# Patient Record
Sex: FEMALE | Employment: UNEMPLOYED | URBAN - METROPOLITAN AREA
[De-identification: names, ages, dates, MRNs, and addresses within clinical notes are randomized per-mention and may not be internally consistent; named-entity substitution may affect disease eponyms.]

---

## 2021-06-06 ENCOUNTER — HOSPITAL ENCOUNTER (EMERGENCY)
Facility: CLINIC | Age: 36
Discharge: HOME | End: 2021-06-06
Attending: EMERGENCY MEDICINE | Admitting: EMERGENCY MEDICINE
Payer: COMMERCIAL

## 2021-06-06 VITALS
RESPIRATION RATE: 16 BRPM | WEIGHT: 135 LBS | DIASTOLIC BLOOD PRESSURE: 104 MMHG | SYSTOLIC BLOOD PRESSURE: 141 MMHG | TEMPERATURE: 97.9 F | HEART RATE: 100 BPM | HEIGHT: 63 IN | BODY MASS INDEX: 23.92 KG/M2 | OXYGEN SATURATION: 100 %

## 2021-06-06 DIAGNOSIS — T43.221A: ICD-10-CM

## 2021-06-06 LAB
ANION GAP SERPL CALCULATED.3IONS-SCNC: 5 MMOL/L (ref 3–14)
APAP SERPL-MCNC: <2 MG/L (ref 10–20)
BASOPHILS # BLD AUTO: 0 10E9/L (ref 0–0.2)
BASOPHILS NFR BLD AUTO: 0.3 %
BUN SERPL-MCNC: 11 MG/DL (ref 7–30)
CALCIUM SERPL-MCNC: 8.7 MG/DL (ref 8.5–10.1)
CHLORIDE SERPL-SCNC: 108 MMOL/L (ref 94–109)
CO2 SERPL-SCNC: 27 MMOL/L (ref 20–32)
CREAT SERPL-MCNC: 0.67 MG/DL (ref 0.52–1.04)
DIFFERENTIAL METHOD BLD: ABNORMAL
EOSINOPHIL # BLD AUTO: 0 10E9/L (ref 0–0.7)
EOSINOPHIL NFR BLD AUTO: 0.2 %
ERYTHROCYTE [DISTWIDTH] IN BLOOD BY AUTOMATED COUNT: 13.3 % (ref 10–15)
GFR SERPL CREATININE-BSD FRML MDRD: >90 ML/MIN/{1.73_M2}
GLUCOSE SERPL-MCNC: 103 MG/DL (ref 70–99)
HCT VFR BLD AUTO: 41.2 % (ref 35–47)
HGB BLD-MCNC: 13.3 G/DL (ref 11.7–15.7)
IMM GRANULOCYTES # BLD: 0 10E9/L (ref 0–0.4)
IMM GRANULOCYTES NFR BLD: 0.4 %
INTERPRETATION ECG - MUSE: NORMAL
LYMPHOCYTES # BLD AUTO: 0.6 10E9/L (ref 0.8–5.3)
LYMPHOCYTES NFR BLD AUTO: 6.4 %
MAGNESIUM SERPL-MCNC: 2.5 MG/DL (ref 1.6–2.3)
MCH RBC QN AUTO: 28.3 PG (ref 26.5–33)
MCHC RBC AUTO-ENTMCNC: 32.3 G/DL (ref 31.5–36.5)
MCV RBC AUTO: 88 FL (ref 78–100)
MONOCYTES # BLD AUTO: 0.7 10E9/L (ref 0–1.3)
MONOCYTES NFR BLD AUTO: 7.1 %
NEUTROPHILS # BLD AUTO: 7.8 10E9/L (ref 1.6–8.3)
NEUTROPHILS NFR BLD AUTO: 85.6 %
NRBC # BLD AUTO: 0 10*3/UL
NRBC BLD AUTO-RTO: 0 /100
PLATELET # BLD AUTO: 282 10E9/L (ref 150–450)
POTASSIUM SERPL-SCNC: 3.1 MMOL/L (ref 3.4–5.3)
RBC # BLD AUTO: 4.7 10E12/L (ref 3.8–5.2)
SALICYLATES SERPL-MCNC: <2 MG/DL
SODIUM SERPL-SCNC: 140 MMOL/L (ref 133–144)
TROPONIN I SERPL-MCNC: <0.015 UG/L (ref 0–0.04)
WBC # BLD AUTO: 9.2 10E9/L (ref 4–11)

## 2021-06-06 PROCEDURE — 99284 EMERGENCY DEPT VISIT MOD MDM: CPT | Mod: 25

## 2021-06-06 PROCEDURE — 83735 ASSAY OF MAGNESIUM: CPT | Performed by: EMERGENCY MEDICINE

## 2021-06-06 PROCEDURE — 80048 BASIC METABOLIC PNL TOTAL CA: CPT | Performed by: EMERGENCY MEDICINE

## 2021-06-06 PROCEDURE — 80143 DRUG ASSAY ACETAMINOPHEN: CPT | Performed by: EMERGENCY MEDICINE

## 2021-06-06 PROCEDURE — 258N000003 HC RX IP 258 OP 636: Performed by: EMERGENCY MEDICINE

## 2021-06-06 PROCEDURE — 96360 HYDRATION IV INFUSION INIT: CPT

## 2021-06-06 PROCEDURE — 93005 ELECTROCARDIOGRAM TRACING: CPT

## 2021-06-06 PROCEDURE — 85025 COMPLETE CBC W/AUTO DIFF WBC: CPT | Performed by: EMERGENCY MEDICINE

## 2021-06-06 PROCEDURE — 250N000013 HC RX MED GY IP 250 OP 250 PS 637: Performed by: EMERGENCY MEDICINE

## 2021-06-06 PROCEDURE — 80179 DRUG ASSAY SALICYLATE: CPT | Performed by: EMERGENCY MEDICINE

## 2021-06-06 PROCEDURE — 84484 ASSAY OF TROPONIN QUANT: CPT | Performed by: EMERGENCY MEDICINE

## 2021-06-06 RX ORDER — POTASSIUM CHLORIDE 1500 MG/1
40 TABLET, EXTENDED RELEASE ORAL ONCE
Status: COMPLETED | OUTPATIENT
Start: 2021-06-06 | End: 2021-06-06

## 2021-06-06 RX ADMIN — POTASSIUM CHLORIDE 40 MEQ: 1500 TABLET, EXTENDED RELEASE ORAL at 09:09

## 2021-06-06 RX ADMIN — SODIUM CHLORIDE 1000 ML: 9 INJECTION, SOLUTION INTRAVENOUS at 07:44

## 2021-06-06 ASSESSMENT — ENCOUNTER SYMPTOMS
LIGHT-HEADEDNESS: 0
NAUSEA: 0
ABDOMINAL PAIN: 0
PALPITATIONS: 0
COUGH: 0
DIZZINESS: 1
CHILLS: 0
CHEST TIGHTNESS: 0
VOMITING: 0
FEVER: 0

## 2021-06-06 ASSESSMENT — MIFFLIN-ST. JEOR: SCORE: 1286.49

## 2021-06-06 NOTE — ED TRIAGE NOTES
Pt says she unintentionally took triple dose of her Sertraline dose. She believes she took a total of 450mg. She is c/o feeling very tired.

## 2021-06-06 NOTE — ED PROVIDER NOTES
"  History   Chief Complaint:  Drug Overdose     HPI   Emerita Lemon is a 35 year old female with history of postpartum depression who presents after a drug overdose. An hour ago, the patient was distracted and unintentionally took triple her Sertraline dose which she has been taking for four years. She believes it was a total of 450mg. Once she realized this, she tried to force herself to throw up, but this was unsuccessful. She notes dizziness and an \"odd feeling\" in her chest but denies any chest pain or chest tightness. Additionally, she denies any suicidal ideation, homicidal ideation, light-headedness, palpitations, fever, chills, cough, chest pain, abdominal pain, nausea or vomiting. Of note, she was in assisted for the past two days and hasn't been drinking as much water as normal. She smokes about 1/2 a pack of cigarettes a day.    Review of Systems   Constitutional: Negative for chills and fever.        Tired   Respiratory: Negative for cough and chest tightness.    Cardiovascular: Negative for chest pain and palpitations.        \"Odd feeling\" in chest   Gastrointestinal: Negative for abdominal pain, nausea and vomiting.   Neurological: Positive for dizziness. Negative for light-headedness.   Psychiatric/Behavioral: Negative for suicidal ideas.   All other systems reviewed and are negative.    Allergies:  The patient has no known allergies.     Medications  Calcium carbonate  Ferrous sulfate    Past Medical History:    Anemia    Past Surgical History:    LEEP TX, Cervical  [place] teeth  Postpartum depression  Tobacco abuse  Group B Streptococcus carrier  Varicella  STD  Migraine  Anxiety    Family History:    Father: Depression  Mother: Depression  Sister: Depression    Social History:  The patient was unaccompanied to the ED.    Physical Exam     Patient Vitals for the past 24 hrs:   BP Temp Temp src Pulse Resp SpO2 Height Weight   06/06/21 1100 (!) 141/104 -- -- 100 16 100 % -- --   06/06/21 1055 -- -- -- " "-- -- 100 % -- --   06/06/21 1035 -- -- -- -- -- 100 % -- --   06/06/21 1000 (!) 156/99 -- -- 102 -- 100 % -- --   06/06/21 0940 (!) 142/100 -- -- 102 -- 100 % -- --   06/06/21 0930 -- -- -- -- -- 100 % -- --   06/06/21 0910 -- -- -- -- -- 100 % -- --   06/06/21 0850 -- -- -- -- -- 100 % -- --   06/06/21 0830 (!) 138/90 -- -- 107 -- 100 % -- --   06/06/21 0825 (!) 150/112 -- -- 100 -- 100 % -- --   06/06/21 0800 (!) 140/102 -- -- 106 -- 100 % -- --   06/06/21 0750 (!) 143/103 -- -- 103 -- 99 % -- --   06/06/21 0655 (!) 140/110 97.9  F (36.6  C) Oral 113 16 98 % 1.6 m (5' 3\") 61.2 kg (135 lb)       Physical Exam  Constitutional: Well developed, nontox appearance  Head: Atraumatic.   Mouth/Throat: Oropharynx is clear and moist.   Neck:  no stridor  Eyes: no scleral icterus  Cardiovascular: Borderline regular tachycardia, 2+ bilat radial pulses  Pulmonary/Chest: nml resp effort, Clear BS bilat  Abdominal: ND, soft, NT, no rebound or guarding   Ext: Warm, well perfused, no edema  Neurological: A&O, symmetric facies, moves ext x4  Skin: Skin is warm and dry.   Psychiatric: Behavior is normal. Thought content normal.   Nursing note and vitals reviewed.    Emergency Department Course   ECG:  ECG taken at 0732  Normal sinus rhythm  Normal ECG  No significant change compared to EKG dated 12/22/2002  Rate 100 bpm. WY interval 124 ms. QRS duration 80 ms. QT/QTc 352/454 ms. P-R-T axes 67 30 48.    Laboratory:  CBC: WBC 9.2, HGB 13.3,   BMP: Potassium 3.1 (L), Glucose 103 (H) o/w WNL (Creatinine 0.67)    Troponin (Collected 0742): <0.015    Magnesium: 2.5 (H)    Acetaminophen Level: <2    Salicylate level: <2    Emergency Department Course:  Reviewed:  I reviewed nursing notes, vitals, past medical history and care everywhere    Assessments:  0717 I obtained history and examined the patient as noted above.     Consults:   2306 I spoke with poison control regarding the patient.    1003 I spoke with poison control regarding " the patient.    Interventions:  0744 NS 1L IV Bolus  0909 Potassium chloride 40mEq PO    Disposition:  The patient eloped from the ED prior to discharge.     Impression & Plan   Medical Decision Makin year old female presenting w/ concern for sertraline overdose     DDx includes sertraline overdose, electrolyte abnormality, dehydration, EKG abnormality, unintentional overdose, serotonin syndrome although inconsistent with clinical presentation at this time.  Patient seems reliable on initial interview; doubt intentional overdose or self-harm/suicide attempt.  EKG interpretation as noted above with normal intervals, no evidence of dysrhythmia.  Discussed patient with poison control who recommended monitoring the patient for approximately 6 hours given the patient is unsure of how much sertraline she actually took. Labs significant for mild hypokalemia otherwise unremarkable.  Potassium repleted in the emergency department.  Pt remained stable in ED.  She eloped 5 hours after her ingestion.    Diagnosis:    ICD-10-CM    1. Selective serotonin re-uptake inhibitor overdose, accidental or unintentional, initial encounter  T43.221A        Scribe Disclosure:  KRISTIAN, Gabriel Mcintyre, am serving as a scribe at 6:59 AM on 2021 to document services personally performed by Eldon Andrews MD based on my observations and the provider's statements to me.      Eldon Andrews MD  21 [id number]

## 2021-06-06 NOTE — ED NOTES
Pt put call light on stating her ride is coming, she wants to leave and she wants her IV out. Informed pt MD wanted her to be observed until noon. Pt states she still wants to leave. MD informed.

## 2021-06-06 NOTE — DISCHARGE INSTRUCTIONS
Please return to the emergency department as needed for new or worsening symptoms including vomiting and unable to keep anything down, severe confusion, fainting

## 2021-06-06 NOTE — ED NOTES
Updated pt with plan of care of monitoring her for 6 hours post ingestion. Pt agreeable to this. Has no complaints at this time.

## 2021-06-06 NOTE — ED NOTES
Pt states she feels more dizzy after getting IV fluids. Updated on wait time for MD to speak with her about next steps. Pt texting on her phone.

## 2021-09-04 ENCOUNTER — TRANSFERRED RECORDS (OUTPATIENT)
Dept: HEALTH INFORMATION MANAGEMENT | Facility: CLINIC | Age: 36
End: 2021-09-04

## 2021-09-04 ENCOUNTER — TELEPHONE (OUTPATIENT)
Dept: BEHAVIORAL HEALTH | Facility: CLINIC | Age: 36
End: 2021-09-04

## 2021-09-04 ENCOUNTER — MEDICAL CORRESPONDENCE (OUTPATIENT)
Dept: HEALTH INFORMATION MANAGEMENT | Facility: CLINIC | Age: 36
End: 2021-09-04

## 2021-09-04 ENCOUNTER — HOSPITAL ENCOUNTER (INPATIENT)
Facility: CLINIC | Age: 36
LOS: 5 days | Discharge: HOME | DRG: 885 | End: 2021-09-09
Attending: PSYCHIATRY & NEUROLOGY | Admitting: PSYCHIATRY & NEUROLOGY
Payer: COMMERCIAL

## 2021-09-04 DIAGNOSIS — F90.2 ADHD (ATTENTION DEFICIT HYPERACTIVITY DISORDER), COMBINED TYPE: Primary | ICD-10-CM

## 2021-09-04 DIAGNOSIS — K59.09 OTHER CONSTIPATION: ICD-10-CM

## 2021-09-04 DIAGNOSIS — D50.8 OTHER IRON DEFICIENCY ANEMIA: ICD-10-CM

## 2021-09-04 DIAGNOSIS — F33.2 MAJOR DEPRESSIVE DISORDER, RECURRENT EPISODE, SEVERE WITH MIXED FEATURES (H): ICD-10-CM

## 2021-09-04 DIAGNOSIS — G47.9 SLEEP DIFFICULTIES: ICD-10-CM

## 2021-09-04 DIAGNOSIS — E56.9 VITAMIN DEFICIENCY: ICD-10-CM

## 2021-09-04 DIAGNOSIS — F10.10 ALCOHOL ABUSE: ICD-10-CM

## 2021-09-04 DIAGNOSIS — F41.1 GAD (GENERALIZED ANXIETY DISORDER): ICD-10-CM

## 2021-09-04 DIAGNOSIS — F43.10 PTSD (POST-TRAUMATIC STRESS DISORDER): ICD-10-CM

## 2021-09-04 PROBLEM — F32.A DEPRESSION, UNSPECIFIED DEPRESSION TYPE: Status: ACTIVE | Noted: 2021-09-04

## 2021-09-04 LAB
ALT SERPL-CCNC: 29 IU/L (ref 12–68)
AST SERPL-CCNC: 17 IU/L (ref 15–37)
CREATININE (EXTERNAL): 0.67 MG/DL (ref 0.55–1.02)
GFR ESTIMATED (EXTERNAL): >60 ML/MIN
GFR ESTIMATED (IF AFRICAN AMERICAN) (EXTERNAL): >60 ML/MIN
GLUCOSE (EXTERNAL): 98 MG/DL (ref 70–110)
POTASSIUM (EXTERNAL): 3.8 MMOL/L (ref 3.5–5.1)

## 2021-09-04 PROCEDURE — 250N000013 HC RX MED GY IP 250 OP 250 PS 637: Performed by: PSYCHIATRY & NEUROLOGY

## 2021-09-04 PROCEDURE — 128N000001 HC R&B CD/MH ADULT

## 2021-09-04 RX ORDER — FERROUS SULFATE 325(65) MG
325 TABLET ORAL 2 TIMES DAILY WITH MEALS
Status: DISCONTINUED | OUTPATIENT
Start: 2021-09-04 | End: 2021-09-09 | Stop reason: HOSPADM

## 2021-09-04 RX ORDER — MAGNESIUM HYDROXIDE/ALUMINUM HYDROXICE/SIMETHICONE 120; 1200; 1200 MG/30ML; MG/30ML; MG/30ML
30 SUSPENSION ORAL EVERY 4 HOURS PRN
Status: DISCONTINUED | OUTPATIENT
Start: 2021-09-04 | End: 2021-09-09 | Stop reason: HOSPADM

## 2021-09-04 RX ORDER — OLANZAPINE 10 MG/2ML
10 INJECTION, POWDER, FOR SOLUTION INTRAMUSCULAR 3 TIMES DAILY PRN
Status: DISCONTINUED | OUTPATIENT
Start: 2021-09-04 | End: 2021-09-09 | Stop reason: HOSPADM

## 2021-09-04 RX ORDER — RIZATRIPTAN BENZOATE 10 MG/1
10 TABLET, ORALLY DISINTEGRATING ORAL
Status: DISCONTINUED | OUTPATIENT
Start: 2021-09-04 | End: 2021-09-04 | Stop reason: CLARIF

## 2021-09-04 RX ORDER — RIZATRIPTAN BENZOATE 10 MG/1
10 TABLET ORAL
Status: DISCONTINUED | OUTPATIENT
Start: 2021-09-04 | End: 2021-09-09 | Stop reason: HOSPADM

## 2021-09-04 RX ORDER — FERROUS GLUCONATE 324(37.5)
1 TABLET ORAL 2 TIMES DAILY WITH MEALS
Status: DISCONTINUED | OUTPATIENT
Start: 2021-09-05 | End: 2021-09-04 | Stop reason: CLARIF

## 2021-09-04 RX ORDER — OLANZAPINE 10 MG/1
10 TABLET ORAL 3 TIMES DAILY PRN
Status: DISCONTINUED | OUTPATIENT
Start: 2021-09-04 | End: 2021-09-09 | Stop reason: HOSPADM

## 2021-09-04 RX ORDER — ATOMOXETINE 40 MG/1
40 CAPSULE ORAL 2 TIMES DAILY
Status: ON HOLD | COMMUNITY
End: 2021-09-09

## 2021-09-04 RX ORDER — SERTRALINE HYDROCHLORIDE 100 MG/1
150 TABLET, FILM COATED ORAL DAILY
Status: ON HOLD | COMMUNITY
End: 2021-09-09

## 2021-09-04 RX ORDER — HYDROXYZINE HYDROCHLORIDE 25 MG/1
50 TABLET, FILM COATED ORAL EVERY 6 HOURS PRN
Status: DISCONTINUED | OUTPATIENT
Start: 2021-09-04 | End: 2021-09-09 | Stop reason: HOSPADM

## 2021-09-04 RX ORDER — RIZATRIPTAN BENZOATE 10 MG/1
10 TABLET, ORALLY DISINTEGRATING ORAL
COMMUNITY

## 2021-09-04 RX ORDER — PRENATAL VIT/IRON FUM/FOLIC AC 27MG-0.8MG
1 TABLET ORAL DAILY
Status: DISCONTINUED | OUTPATIENT
Start: 2021-09-05 | End: 2021-09-09 | Stop reason: HOSPADM

## 2021-09-04 RX ORDER — CALCIUM CARBONATE 500 MG/1
500 TABLET, CHEWABLE ORAL DAILY
Status: DISCONTINUED | OUTPATIENT
Start: 2021-09-04 | End: 2021-09-09 | Stop reason: HOSPADM

## 2021-09-04 RX ORDER — AMOXICILLIN 250 MG
1 CAPSULE ORAL 2 TIMES DAILY PRN
Status: DISCONTINUED | OUTPATIENT
Start: 2021-09-04 | End: 2021-09-09 | Stop reason: HOSPADM

## 2021-09-04 RX ORDER — ACETAMINOPHEN 325 MG/1
650 TABLET ORAL EVERY 4 HOURS PRN
Status: DISCONTINUED | OUTPATIENT
Start: 2021-09-04 | End: 2021-09-09 | Stop reason: HOSPADM

## 2021-09-04 RX ORDER — ONDANSETRON 8 MG/1
8 TABLET, ORALLY DISINTEGRATING ORAL EVERY 8 HOURS PRN
Status: ON HOLD | COMMUNITY
End: 2021-09-04

## 2021-09-04 RX ORDER — TRAZODONE HYDROCHLORIDE 50 MG/1
50 TABLET, FILM COATED ORAL
Status: DISCONTINUED | OUTPATIENT
Start: 2021-09-04 | End: 2021-09-09 | Stop reason: HOSPADM

## 2021-09-04 RX ORDER — FERROUS GLUCONATE 324(37.5)
1 TABLET ORAL 2 TIMES DAILY WITH MEALS
Status: ON HOLD | COMMUNITY
End: 2021-09-09

## 2021-09-04 RX ADMIN — FERROUS SULFATE TAB 325 MG (65 MG ELEMENTAL FE) 325 MG: 325 (65 FE) TAB at 20:28

## 2021-09-04 RX ADMIN — HYDROXYZINE HYDROCHLORIDE 50 MG: 25 TABLET, FILM COATED ORAL at 16:37

## 2021-09-04 RX ADMIN — ANTACID TABLETS 500 MG: 500 TABLET, CHEWABLE ORAL at 20:18

## 2021-09-04 RX ADMIN — ACETAMINOPHEN 650 MG: 325 TABLET ORAL at 16:37

## 2021-09-04 ASSESSMENT — ACTIVITIES OF DAILY LIVING (ADL)
DRESS: INDEPENDENT
LAUNDRY: WITH SUPERVISION
ORAL_HYGIENE: INDEPENDENT
HYGIENE/GROOMING: INDEPENDENT

## 2021-09-04 ASSESSMENT — MIFFLIN-ST. JEOR: SCORE: 1268.11
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
## 2021-09-04 NOTE — TELEPHONE ENCOUNTER
R:  Patient cleared and ready for behavioral bed placement: Yes    7:50am Intake rec'd fax with clinical information  9:05am Intake rec'd fax with lab results: COVID Negative; HCG Negative; UTOX Positive for THC and Amphetamines  9:45am Intake paged Valladares  10:15am: Valladares accepted to unit [facility] under [facility]  10:55am: Intake called unit for patient placement, charge nurse to call back.  11:30am: 5500 charge nurse called back asking for information to be faxed re: patient. Intake faxed over paperwork at 11:35am  12:10pm Intake called [facility] to inform of placement and provided unit number to arrange time.   12:12pm [id number] called for updated vitals and status of patient vomiting, unit was able to talk with ED for updated vitals and will take patient by 1 if transport can get there.
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
## 2021-09-05 PROBLEM — R03.0 ELEVATED BLOOD PRESSURE READING WITHOUT DIAGNOSIS OF HYPERTENSION: Status: ACTIVE | Noted: 2021-09-05

## 2021-09-05 PROBLEM — F10.10 ALCOHOL ABUSE: Chronic | Status: ACTIVE | Noted: 2021-09-05

## 2021-09-05 PROBLEM — F33.9: Chronic | Status: ACTIVE | Noted: 2021-09-04

## 2021-09-05 PROBLEM — G43.109 MIGRAINE WITH AURA, NOT INTRACTABLE, WITHOUT STATUS MIGRAINOSUS: Status: ACTIVE | Noted: 2018-02-12

## 2021-09-05 PROBLEM — F15.10 AMPHETAMINE ABUSE (H): Chronic | Status: ACTIVE | Noted: 2021-09-05

## 2021-09-05 PROBLEM — F90.2 ATTENTION DEFICIT HYPERACTIVITY DISORDER (ADHD), COMBINED TYPE, MODERATE: Status: ACTIVE | Noted: 2018-02-12

## 2021-09-05 PROBLEM — F41.9 ANXIETY: Status: ACTIVE | Noted: 2018-02-12

## 2021-09-05 PROBLEM — F12.20 CANNABIS DEPENDENCE (H): Chronic | Status: ACTIVE | Noted: 2021-09-05

## 2021-09-05 LAB
ALBUMIN SERPL-MCNC: 3.7 G/DL (ref 3.5–5)
ALP SERPL-CCNC: 77 U/L (ref 45–120)
ALT SERPL W P-5'-P-CCNC: 15 U/L (ref 0–45)
ANION GAP SERPL CALCULATED.3IONS-SCNC: 8 MMOL/L (ref 5–18)
AST SERPL W P-5'-P-CCNC: 18 U/L (ref 0–40)
BILIRUB SERPL-MCNC: 0.5 MG/DL (ref 0–1)
BUN SERPL-MCNC: 16 MG/DL (ref 8–22)
CALCIUM SERPL-MCNC: 9.1 MG/DL (ref 8.5–10.5)
CHLORIDE BLD-SCNC: 105 MMOL/L (ref 98–107)
CO2 SERPL-SCNC: 28 MMOL/L (ref 22–31)
CREAT SERPL-MCNC: 0.79 MG/DL (ref 0.6–1.1)
GFR SERPL CREATININE-BSD FRML MDRD: >90 ML/MIN/1.73M2
GLUCOSE BLD-MCNC: 85 MG/DL (ref 70–125)
POTASSIUM BLD-SCNC: 3.9 MMOL/L (ref 3.5–5)
PROT SERPL-MCNC: 6.7 G/DL (ref 6–8)
SODIUM SERPL-SCNC: 141 MMOL/L (ref 136–145)

## 2021-09-05 PROCEDURE — 99231 SBSQ HOSP IP/OBS SF/LOW 25: CPT | Performed by: INTERNAL MEDICINE

## 2021-09-05 PROCEDURE — 250N000013 HC RX MED GY IP 250 OP 250 PS 637: Performed by: PSYCHIATRY & NEUROLOGY

## 2021-09-05 PROCEDURE — 80053 COMPREHEN METABOLIC PANEL: CPT | Performed by: INTERNAL MEDICINE

## 2021-09-05 PROCEDURE — 128N000001 HC R&B CD/MH ADULT

## 2021-09-05 PROCEDURE — 36415 COLL VENOUS BLD VENIPUNCTURE: CPT | Performed by: INTERNAL MEDICINE

## 2021-09-05 PROCEDURE — 99223 1ST HOSP IP/OBS HIGH 75: CPT | Mod: AI | Performed by: PSYCHIATRY & NEUROLOGY

## 2021-09-05 RX ORDER — GABAPENTIN 300 MG/1
300 CAPSULE ORAL 3 TIMES DAILY
Status: DISCONTINUED | OUTPATIENT
Start: 2021-09-05 | End: 2021-09-09 | Stop reason: HOSPADM

## 2021-09-05 RX ORDER — DIAZEPAM 10 MG
10 TABLET ORAL
Status: DISCONTINUED | OUTPATIENT
Start: 2021-09-05 | End: 2021-09-09 | Stop reason: HOSPADM

## 2021-09-05 RX ORDER — FOLIC ACID 1 MG/1
1 TABLET ORAL DAILY
Status: DISCONTINUED | OUTPATIENT
Start: 2021-09-05 | End: 2021-09-09 | Stop reason: HOSPADM

## 2021-09-05 RX ORDER — MULTIVITAMIN,THERAPEUTIC
1 TABLET ORAL DAILY
Status: DISCONTINUED | OUTPATIENT
Start: 2021-09-05 | End: 2021-09-05

## 2021-09-05 RX ORDER — ONDANSETRON 4 MG/1
4 TABLET, ORALLY DISINTEGRATING ORAL EVERY 6 HOURS PRN
Status: DISCONTINUED | OUTPATIENT
Start: 2021-09-05 | End: 2021-09-09 | Stop reason: HOSPADM

## 2021-09-05 RX ORDER — LANOLIN ALCOHOL/MO/W.PET/CERES
100 CREAM (GRAM) TOPICAL DAILY
Status: DISCONTINUED | OUTPATIENT
Start: 2021-09-05 | End: 2021-09-09 | Stop reason: HOSPADM

## 2021-09-05 RX ORDER — GABAPENTIN 300 MG/1
300 CAPSULE ORAL EVERY 6 HOURS PRN
Status: DISCONTINUED | OUTPATIENT
Start: 2021-09-05 | End: 2021-09-09 | Stop reason: HOSPADM

## 2021-09-05 RX ORDER — ARIPIPRAZOLE 5 MG/1
5 TABLET ORAL DAILY
Status: DISCONTINUED | OUTPATIENT
Start: 2021-09-05 | End: 2021-09-09 | Stop reason: HOSPADM

## 2021-09-05 RX ADMIN — GABAPENTIN 300 MG: 300 CAPSULE ORAL at 20:17

## 2021-09-05 RX ADMIN — FERROUS SULFATE TAB 325 MG (65 MG ELEMENTAL FE) 325 MG: 325 (65 FE) TAB at 17:10

## 2021-09-05 RX ADMIN — FERROUS SULFATE TAB 325 MG (65 MG ELEMENTAL FE) 325 MG: 325 (65 FE) TAB at 08:04

## 2021-09-05 RX ADMIN — Medication 100 MG: at 11:58

## 2021-09-05 RX ADMIN — GABAPENTIN 300 MG: 300 CAPSULE ORAL at 14:07

## 2021-09-05 RX ADMIN — FOLIC ACID 1 MG: 1 TABLET ORAL at 11:58

## 2021-09-05 RX ADMIN — NICOTINE POLACRILEX 4 MG: 4 GUM, CHEWING ORAL at 20:16

## 2021-09-05 RX ADMIN — NICOTINE POLACRILEX 4 MG: 4 GUM, CHEWING ORAL at 15:50

## 2021-09-05 RX ADMIN — ARIPIPRAZOLE 5 MG: 5 TABLET ORAL at 11:58

## 2021-09-05 RX ADMIN — ANTACID TABLETS 500 MG: 500 TABLET, CHEWABLE ORAL at 08:04

## 2021-09-05 ASSESSMENT — ACTIVITIES OF DAILY LIVING (ADL)
LAUNDRY: WITH SUPERVISION
HYGIENE/GROOMING: INDEPENDENT
DRESS: INDEPENDENT
DRESS: INDEPENDENT
ORAL_HYGIENE: INDEPENDENT
HYGIENE/GROOMING: INDEPENDENT
ORAL_HYGIENE: INDEPENDENT
LAUNDRY: WITH SUPERVISION

## 2021-09-05 NOTE — PLAN OF CARE
Problem: Behavioral Health Plan of Care  Goal: Plan of Care Review  9/5/2021 1715 by Will Weber, RN  Outcome: Improving     Problem: Suicidal Behavior  Goal: Suicidal Behavior is Absent or Managed  9/5/2021 1715 by Will Weber RN  Outcome: Improving    Problem: Suicide Risk  Goal: Absence of Self-Harm  9/5/2021 1715 by Will Weber, RN  Outcome: Improving    Problem: Alcohol Withdrawal  Goal: Alcohol Withdrawal Symptom Control  Outcome: Improving    Patient was bright and pleasant. She was calm and cooperative. Socialized and engaged with peers. Attended community meeting. Out in the milieux most of the shift. She denied pain all shift. Denied all psych symptoms and contracted for safety. She was cooperative and med compliant. Prn Nicotine gum 4 mg given x 1. She was out for all meals and ate 100%. She had a shower this evening. She is on CIWA and her score was 0. No other concerns or behavior noted at this time.  visited this evening. Will continue to monitor and will assist if need arise.

## 2021-09-05 NOTE — CONSULTS
Internal Medicine Consultation   Emerita Lemon,  1985, MRN [id number]    [unfilled]  Depression, unspecified depression type [F32.9]    PCP: [facility], [unfilled], [phone]   Code status:  Full Code       Extended Emergency Contact Information  Primary Emergency Contact: Christophe Valdes  Home Phone: [phone]  Relation: Spouse    Requesting Provider: Dr. Stanley Valladares     Assessment and Plan   36 yo F who we are asked to consult on by Dr. Valladares for elevated blood pressure and h/o polysubstance abuse in the context of attempted suicide by overdose of her pills.  I believe the elevated BP was due to stress from ED visit and transfer to inpatient psychiatry and would not treat.  She is not taking any substances that would cause severe withdrawal.  She may have some mild withdrawal from the amphetamines but would doubt anything of any significance.  We will sign off.    Principal Problem:    Elevated blood pressure reading without diagnosis of hypertension - likely stress related. Is much better this morning. No treatment for now unless recurs and lasts for several days.    History of substance abuse - amphetamines, cocaine, and marijuana - not at high risk for severe withdrawal.  Monitor conservatively for now.    Depression, unspecified depression type - s/p suicide attempt - per psychiatry    Drug overdose - Strattera, Excedrin, melatonin, zoloft - monitored at [facility] and cleared by poison control after appropriate monitoring.  Recheck CMP.    Active Problems:    Anxiety    Attention deficit hyperactivity disorder (ADHD), combined type, moderate    PTSD (post-traumatic stress disorder)    History of migraine      DVT Prophylaxis: up walking     Chief Complaint: Elevated blood pressure.     HPI:    Emerita Lemon is a 35 year old old female who we are asked to consult on by Dr. Valladares for elevated blood pressure and h/o polysubstance abuse in the context of  attempted suicide by overdose of her pills.  Patient has been feeling well medically - no cough or cold symptoms or other concerns. She has just been stressed because of being homeless and some stress with her family. She took Strattera, Excedrin, melatonin, and Zoloft. She does not know the numbers of age specifically but it was a few of each. She had a little upset stomach and I gave her some Tums for it but other than that she has felt fine. She was monitored at [facility] as guided by poison control. She has not had any further issues. She has never had high blood pressure before. She has not been treated for any ongoing chronic medical problems other than ADHD and depression/anxiety. No fevers or chills or sweats or nausea vomiting or diarrhea recently. No cough or shortness of breath or chest pain or palpitations. No headaches or other issues.  History is provided by patient and chart review       Medical History  Past Medical History:   Diagnosis Date     ADHD      Anemia      Anxiety      Depressive disorder      Migraine      Post partum depression      PTSD (post-traumatic stress disorder)      STD (female)      Substance abuse (H)     polysubstance          Surgical History  She  has a past surgical history that includes [place] teeth; leep tx, cervical; and tubal ligation (12/13/2016).       Social History  Reviewed, and she  reports that she has been smoking. She does not have any smokeless tobacco history on file. She reports current alcohol use. She reports current drug use. Drug: Marijuana.       Allergies  No Known Allergies Family History  Reviewed, and family history includes Asthma in her sister and sister; Attention Deficit Disorder in her sister and sister; Bipolar Disorder in her father; Cerebrovascular Disease in her maternal grandmother; Chronic Obstructive Pulmonary Disease in her maternal grandfather; Depression in her mother; Diabetes Type 2  in her maternal grandmother and paternal  "grandmother; Hypertension in her father; Narcolepsy in her sister.          Prior to Admission Medications   Medications Prior to Admission   Medication Sig Dispense Refill Last Dose     atomoxetine (STRATTERA) 40 MG capsule Take 40 mg by mouth 2 times daily Last Prescription May 2021 for 90 day supply by Provider Nya Martinez.   possible OD at Possible OD     calcium carbonate (TUMS) 500 MG chewable tablet Take 1 chew tab by mouth daily   Unknown at Unknown time     Ferrous Gluconate 324 (37.5 Fe) MG TABS Take 1 tablet by mouth 2 times daily (with meals)   Unknown at Unknown time     rizatriptan (MAXALT-MLT) 10 MG ODT Take 10 mg by mouth at onset of headache for migraine 10 mg on onset of headache bid prn migraine, repeat dose after a minimum of 2 hours maximum total dose in 24 hours of 30 mg.    Ordered by Nya Martinez Provider.   Possible OD at Possible OD     sertraline (ZOLOFT) 100 MG tablet Take 150 mg by mouth daily Last Prescription filled 6/2021 for 90 days by Provider Nya Martinez.   Possible OD at Possible OD          Review of Systems:  A 12 point comprehensive review of systems was negative except as noted. Physical Exam:  Temp:  [97.7  F (36.5  C)] 97.7  F (36.5  C)  Pulse:  [89] 89  Resp:  [18] 18  BP: (136)/(97) 136/97  SpO2:  [100 %] 100 %    BP (!) 136/97   Pulse 89   Temp 97.7  F (36.5  C) (Oral)   Resp 18   Ht 1.613 m (5' 3.5\")   Wt 58.6 kg (129 lb 3.2 oz)   SpO2 100%   BMI 22.53 kg/m      General Appearance:   Middle-age female in no acute distress   Head:    Normocephalic, without obvious abnormality, atraumatic   Eyes:    PERRL, conjunctiva/corneas clear, EOM's intact,both eyes    Ears:    Normal external ear canals no drainage or erythema bilat.   Nose:   Nares normal by gross inspection,  mucosa normal, no drainage or sinus tenderness   Throat:   Lips, mucosa, and tongue normal; teeth and gums normal   Neck:   Supple, symmetrical, trachea midline, no adenopathy;        thyroid:  No " enlargement/tenderness/nodules   Back:     Symmetric, no curvature, ROM normal, no CVA tenderness   Lungs:    Clear to auscultation   Chest wall:    No tenderness or deformity   Heart:    Regular rate and rhythm, S1 and S2 normal, no murmur, no rubs, no JVD, no edema   Abdomen:     Soft, non-tender, bowel sounds active all four quadrants,     no masses, no hepatosplenomegaly   Musculoskeletal:   Extremities are warm and non-tender, atraumatic, no joint swelling or tenderness   Pulses:   2+ and symmetric all extremities   Skin:   Skin color, texture, turgor normal, no rashes or lesions on exposed areas, please see nursing assessment for full skin assessment   Neurologic:  Alert, cranial nerves II through XII, oriented x3, motor strength 5 out of 5 upper and lower extremities symmetric, sensory grossly intact to light touch        Pertinent Labs  Lab Results: personally reviewed.         Pertinent Radiology  reviewed    Wilbert Madrigal MD  Internal Medicine Hospitalist  9/5/2021

## 2021-09-05 NOTE — PHARMACY-ADMISSION MEDICATION HISTORY
Pharmacy Note - Admission Medication History    Pertinent Provider Information:   Information obtained from patients pharmacy and hospital records.  Uncertain if patient is taking Strattera and sertraline    Strattera was last filled on 4/28/21 for 90 days  Sertraline was last filled on 6/11/21 for 30 days  Patient is unable to confirm or deny at this time    ______________________________________________________________________    Prior To Admission (PTA) med list completed and updated in EMR.       PTA Med List   Medication Sig Last Dose     atomoxetine (STRATTERA) 40 MG capsule Take 40 mg by mouth 2 times daily Last Prescription May 2021 for 90 day supply by Provider Nya Martinez. possible OD at Possible OD     calcium carbonate (TUMS) 500 MG chewable tablet Take 1 chew tab by mouth daily Unknown at Unknown time     Ferrous Gluconate 324 (37.5 Fe) MG TABS Take 1 tablet by mouth 2 times daily (with meals) Unknown at Unknown time     rizatriptan (MAXALT-MLT) 10 MG ODT Take 10 mg by mouth at onset of headache for migraine 10 mg on onset of headache bid prn migraine, repeat dose after a minimum of 2 hours maximum total dose in 24 hours of 30 mg.    Ordered by Nya Martinez Provider. Possible OD at Possible OD     sertraline (ZOLOFT) 100 MG tablet Take 150 mg by mouth daily Last Prescription filled 6/2021 for 90 days by Provider Nya Martinez. Possible OD at Possible OD       Information source(s): Patient's pharmacy, Clinic records and Hospital records  Method of interview communication: N/A      Patient was asked about OTC/herbal products specifically.  PTA med list reflects this.         Patient does not use any multi-dose medications prior to admission.    The information provided in this note is only as accurate as the sources available at the time of the update(s).    Thank you for the opportunity to participate in the care of this patient.    Yael Lamb [facility]  9/4/2021 9:05 PM

## 2021-09-05 NOTE — PLAN OF CARE
Problem: Behavioral Health Plan of Care  Goal: Absence of New-Onset Illness or Injury  Outcome: Improving     Problem: Suicidal Behavior  Goal: Suicidal Behavior is Absent or Managed  Outcome: Improving     Problem: Suicide Risk  Goal: Absence of Self-Harm  Outcome: Improving   Patient slept well without requesting for prn medications, no complaints of pain, anxiety or discomfort. Patient was cooperative with safety checks with no untoward behavioral manifestations. Patient endorses no SI/HI or SIB, patient was not observed responding to  Internal stimuli, neither was there evidence for withdrawal or distress. Patient is adjusting to the unit situations. All precautions in progress.    Jose Neumann DNP, RN, APRN, CNS, AGCNS-BC.
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
## 2021-09-05 NOTE — PROGRESS NOTES
09/05/21 9450   Engagement   Intervention Group   Topic Detail Chair yoga for physical and emotional wellness, relaxation training, and coping with stress   Attendance Did not attend     Pt was sleeping on approach and did not respond to the sound of her name.

## 2021-09-05 NOTE — PLAN OF CARE
Problem: Behavioral Health Plan of Care  Goal: Adheres to Safety Considerations for Self and Others  Outcome: No Change  Intervention: Develop and Maintain Individualized Safety Plan  Recent Flowsheet Documentation  Taken 9/5/2021 0810 by Jenny Vinson RN  Safety Measures: safety rounds completed     Problem: Behavioral Health Plan of Care  Goal: Optimized Coping Skills in Response to Life Stressors  Outcome: No Change     Patient is pleasant, calm and cooperative. No complaints of pain. Patient's anxiety is 3/10 and depression is 0/10. Denies SI, SIB, HI and all hallucinations. Out in the milieu for meals, watching television and drawing. Did not attend group. Contract for safety. Medication complaint. No signs of withdrawal. CIWA score of 1.     No prn medications given    Continue suicide and self injury precautions

## 2021-09-06 PROCEDURE — 128N000001 HC R&B CD/MH ADULT

## 2021-09-06 PROCEDURE — 250N000013 HC RX MED GY IP 250 OP 250 PS 637: Performed by: PSYCHIATRY & NEUROLOGY

## 2021-09-06 RX ADMIN — NICOTINE POLACRILEX 4 MG: 4 GUM, CHEWING ORAL at 20:19

## 2021-09-06 RX ADMIN — ARIPIPRAZOLE 5 MG: 5 TABLET ORAL at 08:32

## 2021-09-06 RX ADMIN — ACETAMINOPHEN 650 MG: 325 TABLET ORAL at 08:35

## 2021-09-06 RX ADMIN — HYDROXYZINE HYDROCHLORIDE 50 MG: 25 TABLET, FILM COATED ORAL at 04:22

## 2021-09-06 RX ADMIN — GABAPENTIN 300 MG: 300 CAPSULE ORAL at 08:32

## 2021-09-06 RX ADMIN — GABAPENTIN 300 MG: 300 CAPSULE ORAL at 20:19

## 2021-09-06 RX ADMIN — FERROUS SULFATE TAB 325 MG (65 MG ELEMENTAL FE) 325 MG: 325 (65 FE) TAB at 18:57

## 2021-09-06 RX ADMIN — ACETAMINOPHEN 650 MG: 325 TABLET ORAL at 04:21

## 2021-09-06 RX ADMIN — Medication 100 MG: at 08:31

## 2021-09-06 RX ADMIN — GABAPENTIN 300 MG: 300 CAPSULE ORAL at 13:49

## 2021-09-06 RX ADMIN — FOLIC ACID 1 MG: 1 TABLET ORAL at 08:32

## 2021-09-06 RX ADMIN — ANTACID TABLETS 500 MG: 500 TABLET, CHEWABLE ORAL at 08:33

## 2021-09-06 RX ADMIN — FERROUS SULFATE TAB 325 MG (65 MG ELEMENTAL FE) 325 MG: 325 (65 FE) TAB at 08:33

## 2021-09-06 RX ADMIN — PRENATAL VIT W/ FE FUMARATE-FA TAB 27-0.8 MG 1 TABLET: 27-0.8 TAB at 08:36

## 2021-09-06 ASSESSMENT — ACTIVITIES OF DAILY LIVING (ADL)
DRESS: INDEPENDENT
HYGIENE/GROOMING: INDEPENDENT
LAUNDRY: WITH SUPERVISION
ORAL_HYGIENE: INDEPENDENT
ORAL_HYGIENE: INDEPENDENT
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
## 2021-09-06 NOTE — PLAN OF CARE
Problem: Behavioral Health Plan of Care  Goal: Plan of Care Review  Outcome: Improving  Flowsheets (Taken 9/6/2021 2261)  Plan of Care Reviewed With: patient  Patient Agreement with Plan of Care: agrees     Problem: Suicidal Behavior  Goal: Suicidal Behavior is Absent or Managed  Outcome: Improving     Problem: Suicide Risk  Goal: Absence of Self-Harm  Outcome: Improving     Problem: Alcohol Withdrawal  Goal: Alcohol Withdrawal Symptom Control  Outcome: Improving     Patient was bright and pleasant. She was calm and cooperative. Socialized and engaged with peers. Did not attend community meeting. Denied pain and all psych symptoms. Contracted for safety. She was cooperative and med compliant. Prn Nicotine gum 4 mg was given x 1. She was out for all meals and ate 100%. Had a shower. CIWA was 0. No other concerns or behavior noted at this time. Will continue to monitor and will assist if need arise

## 2021-09-06 NOTE — PLAN OF CARE
Problem: Behavioral Health Plan of Care  Goal: Plan of Care Review  Outcome: Improving  Flowsheets (Taken 9/6/2021 5959)  Plan of Care Reviewed With: patient  Patient Agreement with Plan of Care: refuses to participate     Problem: Behavioral Health Plan of Care  Goal: Adheres to Safety Considerations for Self and Others  Outcome: Improving     Problem: Behavioral Health Plan of Care  Goal: Patient-Specific Goal (Individualization)  Outcome: Improving  Note: Shift Summery: Patient observed calm, rated her anxiety/depression 0/10. Denies suicidal ideation, denies SI, HI, contracted for safety. Visible on the unit, engaged  and social with peers. Complains of headache, rated pain 5/10, gave her as needed pain medication with good effects.    Patient's Stated Goal for Shift:  None    Goal Status: None

## 2021-09-06 NOTE — PLAN OF CARE
Problem: Behavioral Health Plan of Care  Goal: Optimized Coping Skills in Response to Life Stressors  Outcome: Improving     Problem: Suicide Risk  Goal: Absence of Self-Harm  Outcome: Improving     Awakened x1 between 0345 and 0515. Initially requested for extra pillow and H2O. Then reports neck pain rated at 7/10, mild headache and anxiety. CIWA=4. VSS, PRN Tylenol, Vistaril and cold pack offered per request. Denies other  signs/symptoms of withdrawal.  Safety--suicide and SIB precautions in place. No behavior issues over the night. Observed per routine. Appeared to be sleeping on all other safety checks at [facility]. No further complain of pain distress/discomfort

## 2021-09-06 NOTE — PROGRESS NOTES
09/06/21 1409   Engagement   Intervention Group   Topic Detail Bocce ball and music for relaxation, leisure, socializing   Attendance Did not attend  (no show after invite)

## 2021-09-06 NOTE — PROGRESS NOTES
Occupational Therapy   AIDET      Patient was introduced to the role of occupational therapy and oriented to the process of occupational therapy services on the unit, including function of groups. Patient was given the Occupational Therapy Questionnaire to complete. Patient agreeable to filling out form and asking any additional questions tomorrow during follow up visit if any arose. OT will follow up with assessment and address any questions, needs, or concerns.

## 2021-09-07 PROBLEM — F33.2 MAJOR DEPRESSIVE DISORDER, RECURRENT EPISODE, SEVERE WITH MIXED FEATURES (H): Status: ACTIVE | Noted: 2021-09-07

## 2021-09-07 PROBLEM — F12.10 CANNABIS ABUSE: Status: ACTIVE | Noted: 2021-09-07

## 2021-09-07 PROBLEM — F90.2 ADHD (ATTENTION DEFICIT HYPERACTIVITY DISORDER), COMBINED TYPE: Status: ACTIVE | Noted: 2021-09-07

## 2021-09-07 PROCEDURE — 128N000001 HC R&B CD/MH ADULT

## 2021-09-07 PROCEDURE — 90853 GROUP PSYCHOTHERAPY: CPT

## 2021-09-07 PROCEDURE — 250N000013 HC RX MED GY IP 250 OP 250 PS 637: Performed by: PSYCHIATRY & NEUROLOGY

## 2021-09-07 PROCEDURE — 99233 SBSQ HOSP IP/OBS HIGH 50: CPT | Performed by: PSYCHIATRY & NEUROLOGY

## 2021-09-07 RX ORDER — POLYETHYLENE GLYCOL 3350 17 G/17G
17 POWDER, FOR SOLUTION ORAL DAILY PRN
Status: DISCONTINUED | OUTPATIENT
Start: 2021-09-07 | End: 2021-09-09 | Stop reason: HOSPADM

## 2021-09-07 RX ORDER — AMOXICILLIN 250 MG
1 CAPSULE ORAL AT BEDTIME
Status: DISCONTINUED | OUTPATIENT
Start: 2021-09-07 | End: 2021-09-09 | Stop reason: HOSPADM

## 2021-09-07 RX ORDER — ATOMOXETINE 40 MG/1
40 CAPSULE ORAL DAILY
Status: DISCONTINUED | OUTPATIENT
Start: 2021-09-08 | End: 2021-09-09 | Stop reason: HOSPADM

## 2021-09-07 RX ORDER — PRAZOSIN HYDROCHLORIDE 1 MG/1
2 CAPSULE ORAL AT BEDTIME
Status: DISCONTINUED | OUTPATIENT
Start: 2021-09-07 | End: 2021-09-09 | Stop reason: HOSPADM

## 2021-09-07 RX ADMIN — TRAZODONE HYDROCHLORIDE 50 MG: 50 TABLET ORAL at 22:02

## 2021-09-07 RX ADMIN — GABAPENTIN 300 MG: 300 CAPSULE ORAL at 20:04

## 2021-09-07 RX ADMIN — PRAZOSIN HYDROCHLORIDE 2 MG: 1 CAPSULE ORAL at 22:02

## 2021-09-07 RX ADMIN — GABAPENTIN 300 MG: 300 CAPSULE ORAL at 13:43

## 2021-09-07 RX ADMIN — ACETAMINOPHEN 650 MG: 325 TABLET ORAL at 05:30

## 2021-09-07 RX ADMIN — DOCUSATE SODIUM AND SENNOSIDES 1 TABLET: 50; 8.6 TABLET ORAL at 21:31

## 2021-09-07 RX ADMIN — FOLIC ACID 1 MG: 1 TABLET ORAL at 08:17

## 2021-09-07 RX ADMIN — PRENATAL VIT W/ FE FUMARATE-FA TAB 27-0.8 MG 1 TABLET: 27-0.8 TAB at 08:17

## 2021-09-07 RX ADMIN — FERROUS SULFATE TAB 325 MG (65 MG ELEMENTAL FE) 325 MG: 325 (65 FE) TAB at 17:26

## 2021-09-07 RX ADMIN — Medication 100 MG: at 08:17

## 2021-09-07 RX ADMIN — FERROUS SULFATE TAB 325 MG (65 MG ELEMENTAL FE) 325 MG: 325 (65 FE) TAB at 08:17

## 2021-09-07 RX ADMIN — ARIPIPRAZOLE 5 MG: 5 TABLET ORAL at 08:17

## 2021-09-07 RX ADMIN — ANTACID TABLETS 500 MG: 500 TABLET, CHEWABLE ORAL at 08:17

## 2021-09-07 RX ADMIN — GABAPENTIN 300 MG: 300 CAPSULE ORAL at 08:17

## 2021-09-07 ASSESSMENT — ACTIVITIES OF DAILY LIVING (ADL)
HYGIENE/GROOMING: INDEPENDENT
HYGIENE/GROOMING: INDEPENDENT
DRESS: INDEPENDENT
LAUNDRY: WITH SUPERVISION
ORAL_HYGIENE: INDEPENDENT
DRESS: INDEPENDENT
ORAL_HYGIENE: INDEPENDENT

## 2021-09-07 ASSESSMENT — MIFFLIN-ST. JEOR: SCORE: 1295.78

## 2021-09-07 NOTE — PROGRESS NOTES
09/07/21 1100   Engagement   Intervention Group   Topic Symptom management   Topic Detail SW: DBT   Attendance Attended   Patient Response Was respectful   Concentrated on Task duration of group   Cognition Takes initiative   Mood/Affect Pleasant   Social/Behavioral Engaged   Thought Content Reality oriented

## 2021-09-07 NOTE — PROGRESS NOTES
09/07/21 1526   Engagement   Intervention Group   Topic Detail OT: Haley manual skills task for creative expression, planning/sequencing/attention to detail and managing stress/sxs   Attendance Attended   Patient Response Demonstrated understanding of materials provided;Was respectful;Contributes to conversation;Positive attitude   Concentrated on Task duration of group   Cognition Goal-directed;Follows through with task;Takes initiative   Mood/Affect Pleasant;Content   Social/Behavioral Cooperative;Engaged;Motivated   Goals addressed in session today yes- creative expression task for coping; pt was engaged with staff and peers in a prosocial manner; talked about crafts that she likes to do with her kids; talked casually about her family in a positive manner     Per pt's interest and request, OT provided pt with a yoga mat and yoga routine handout for independent use in her room.
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
## 2021-09-07 NOTE — PLAN OF CARE
Problem: Behavioral Health Plan of Care  Goal: Adheres to Safety Considerations for Self and Others  Outcome: Improving  Goal: Absence of New-Onset Illness or Injury  Outcome: Improving     Problem: Suicidal Behavior  Goal: Suicidal Behavior is Absent or Managed  Outcome: Improving     Problem: Suicide Risk  Goal: Absence of Self-Harm  Outcome: Improving   Patient slept well  Up at about 0530 with complaints of frontal headache rated 5/10, received tylenol 650 mg. Patient attributed headache to Olanzapine which she was newly started on. Patient was cooperative with no behavioral disturbances. Patient endorses no SI/HI, A/VH or SIB. No evidence of withdrawal or respiratory distress. Overall, patient achieves greater than 6 hours of good quality sleep. Will continue to monitor and follow plan of care.    Jose Neumann DNP, RN, APRN, CNS, AGCNS-BC.

## 2021-09-07 NOTE — PLAN OF CARE
Problem: Behavioral Health Plan of Care  Goal: Plan of Care Review  Outcome: Improving     Problem: Suicidal Behavior  Goal: Suicidal Behavior is Absent or Managed  Outcome: Improving  Patient presened a bright affect this evening. Calm pleasant and engaging with peers. She endorsed anxiety 3/10 but denied  depression and all other psych symptoms jose g for safety. She attended the 4 pm community meting and participated  Medication compliant ate 100% dinner and snack and had good fluid intake. She was visited by her boy friend who brought her a piece of chocolate bar.Staff found it in her room during room sweep and she handed it to staff apolozing for the inconvenience this might have caused staff. She told staff she was just craving a piece of chocolate. Patient was started on prazosin tonight  and requested trazodone to help her sleep. Will continue to monitor and assist when need arise.Rae Ceballos RN

## 2021-09-07 NOTE — PLAN OF CARE
Problem: Behavioral Health Plan of Care  Goal: Plan of Care Review  Recent Flowsheet Documentation  Taken 9/7/2021 0900 by Usha Velazquez RN  Plan of Care Reviewed With: patient  Patient Agreement with Plan of Care: agrees   Patient was bright and pleasant. She was calm and cooperative.,socialized and engaged with peers. She endorsed of headache, rated it at 1. Anxiety 5 and depression was low per patient. She denies SI, HI and hallucination. She contracted for safety. She was cooperative and med compliant.. She was out for all meals and ate 100%.  CIWA was 2 and 0. Rule 25 was done. Will continue to monitor.

## 2021-09-07 NOTE — PLAN OF CARE
"   09/07/21 1300   Occupational Therapy Psychosocial Assessment   Assessment Type Interview;Interview Form;Chart Review;Interdisciplinary Team Report   Prior Level of Function   Current Living Arrangements homeless  (living with friends or family)   Therapy Assessment   Patient Presentation Pt was somnolent but willing to engage in interview while lying in bed. Affect was blunted and pt was somewhat disengaged but did offer short answers to questions and made a request for a yoga mat and radio headphones for coping.  Pt shared that she is in the hospital following an increase in depression and increase in psychosocial stressors, specifically stressors that are family-related.  Stressors include: homelessness, unemployment and having children removed by CPS within the past few months.  Pt did not reference drug use during our conversation.  In chart, she admitted to substance use to include cannabis, cocaine, amphetamines and alcohol; Pt is presenting as indifferent towards substance use.  Per chart review, pt is a 36 y/o female with h/o postpartum depression, anxiety and ADHD.  Pt presented to the ED following an intentional overdose of prescribed medications that she later admitted was in an attempt to get attention from her  and because she did not want to \"feel anything\".  Pt has 5 children that are in CPS and her mother is currently watching.     Patient-identified areas of success Taking care of the place of living;Managing basic needs (food, medicine, sleep);Learning new things;Ability to pursue personal goals   Healthy Leisure Activities being in nature, relaxing, swimming   Patient-identified areas of difficulty Difficulty concentrating;Lacks support;Motivation/mood;Finances;Sleeping too much or not enough;Missing work/appointments;Lack of satisfying daily routine;Living environment;Transportation   Patient-identified sources of support A best frient or significant other;Belief in self and " "abilities;Access to email, social media, phone calls;Professional support  (lacks emotional/social support; enjoys being with her kids)   Professional support details psychiatrist, therapist   Patient-identified emotional, physical or mental health concerns \"sad and living situation.\"    Patient-identified coping stategies for these concerns \"My  and friends. Music.\"   Patient-identified stressors or changes in the past year \"Burglularized by family members. Left home.\"   Patient-identified values \"Honesty. Integrity.\"   Patient's goal for the future \"Get a home.\"  Live with  and kids.   Patient's goals to work on with OT Learn ways to stay well and avoid coming to the hospital;Feel more confident;Handle own frustrations   Clinical Impression   Patient Personal Strengths community support;coping skills;independent living skills;interests/hobbies;resilient;self-reliant   Pt appears to have the following barriers/limitations Hopelessness;Limited insight into mental health symptoms;Unstable living arrangements;Low confidence;Negative thinking;Poorly managed mental health symptoms;Poor social support;External locus of control;Lack of daily routine   Patient's barriers/limitations contribute to Exacerbation of mental health symptoms;Relapse of substance abuse;Poor follow through with treatment recommendations;Limited active engagement in recovery strategies;Limited pursuit of recovery strategies   Planned Interventions Encourage group attendance;Identify and develop coping strategies;Identify and develop relapse prevention plan;Continue to build rapport;Engage in activities for insight development;Improve self-care;Encourage engagement in meaningful activities;Encourage improved social interaction skills;Improve self-management skills;Improve work and leisure process skills   Risk & Benefits of therapy have been explained patient   Minutes Spent with Patient 8   Beh OT Plan for Next Session Pt will engage " in 3 or more coping skills to manage stress and symptoms

## 2021-09-08 PROCEDURE — 128N000001 HC R&B CD/MH ADULT

## 2021-09-08 PROCEDURE — 90853 GROUP PSYCHOTHERAPY: CPT

## 2021-09-08 PROCEDURE — 99233 SBSQ HOSP IP/OBS HIGH 50: CPT | Performed by: PSYCHIATRY & NEUROLOGY

## 2021-09-08 PROCEDURE — 250N000013 HC RX MED GY IP 250 OP 250 PS 637: Performed by: PSYCHIATRY & NEUROLOGY

## 2021-09-08 RX ADMIN — GABAPENTIN 300 MG: 300 CAPSULE ORAL at 08:33

## 2021-09-08 RX ADMIN — PRAZOSIN HYDROCHLORIDE 2 MG: 1 CAPSULE ORAL at 20:11

## 2021-09-08 RX ADMIN — GABAPENTIN 300 MG: 300 CAPSULE ORAL at 20:11

## 2021-09-08 RX ADMIN — OLANZAPINE 10 MG: 10 TABLET, FILM COATED ORAL at 12:34

## 2021-09-08 RX ADMIN — PRENATAL VIT W/ FE FUMARATE-FA TAB 27-0.8 MG 1 TABLET: 27-0.8 TAB at 08:33

## 2021-09-08 RX ADMIN — DOCUSATE SODIUM AND SENNOSIDES 1 TABLET: 50; 8.6 TABLET ORAL at 20:10

## 2021-09-08 RX ADMIN — FOLIC ACID 1 MG: 1 TABLET ORAL at 08:33

## 2021-09-08 RX ADMIN — ATOMOXETINE 40 MG: 40 CAPSULE ORAL at 08:33

## 2021-09-08 RX ADMIN — ACETAMINOPHEN 650 MG: 325 TABLET ORAL at 05:16

## 2021-09-08 RX ADMIN — FERROUS SULFATE TAB 325 MG (65 MG ELEMENTAL FE) 325 MG: 325 (65 FE) TAB at 08:34

## 2021-09-08 RX ADMIN — TRAZODONE HYDROCHLORIDE 50 MG: 50 TABLET ORAL at 20:11

## 2021-09-08 RX ADMIN — GABAPENTIN 300 MG: 300 CAPSULE ORAL at 14:11

## 2021-09-08 RX ADMIN — ARIPIPRAZOLE 5 MG: 5 TABLET ORAL at 08:33

## 2021-09-08 RX ADMIN — NICOTINE POLACRILEX 4 MG: 4 GUM, CHEWING ORAL at 12:34

## 2021-09-08 RX ADMIN — Medication 100 MG: at 08:44

## 2021-09-08 RX ADMIN — FERROUS SULFATE TAB 325 MG (65 MG ELEMENTAL FE) 325 MG: 325 (65 FE) TAB at 18:17

## 2021-09-08 RX ADMIN — ACETAMINOPHEN 650 MG: 325 TABLET ORAL at 08:34

## 2021-09-08 RX ADMIN — ANTACID TABLETS 500 MG: 500 TABLET, CHEWABLE ORAL at 08:33

## 2021-09-08 ASSESSMENT — ACTIVITIES OF DAILY LIVING (ADL)
LAUNDRY: WITH SUPERVISION
ORAL_HYGIENE: INDEPENDENT
ORAL_HYGIENE: INDEPENDENT
HYGIENE/GROOMING: INDEPENDENT
DRESS: INDEPENDENT
DRESS: INDEPENDENT
HYGIENE/GROOMING: INDEPENDENT
LAUNDRY: WITH SUPERVISION

## 2021-09-08 NOTE — PLAN OF CARE
Problem: Behavioral Health Plan of Care  Goal: Plan of Care Review  Outcome: Improving  Problem: Suicidal Behavior  Goal: Suicidal Behavior is Absent or Managed  Outcome: Improving   Patient was isolative in her room and appeared tired and sleepy most of the evening. Did not attend community meeting. Denied pain and all psych symptoms. Contracted for safety. She was cooperative and med compliant. She ate 100% of her dinner and snacks, Will continue to assist as needed.Rae Ceballos RN

## 2021-09-08 NOTE — PLAN OF CARE
Problem: Behavioral Health Plan of Care  Goal: Adheres to Safety Considerations for Self and Others  Outcome: Improving  Intervention: Develop and Maintain Individualized Safety Plan  Recent Flowsheet Documentation  Taken 9/8/2021 1000 by Isabel Renner RN  Safety Measures: safety rounds completed     Problem: Behavioral Health Plan of Care  Goal: Optimized Coping Skills in Response to Life Stressors  Outcome: Improving     VSS. Med compliant.  Pt wasvisible on the unit . Pt attended groups Pt social with  peers. Pt was A/O and thought content observed to be clear and organized. Speech was appropriate Pt rated depression 3/10, anxiety 0/10, and pain 4/10. PRN tylenol given per Pt request for headache. Pt reported relief. Pt denied having SI or HI. Pt denied having hallucinations and all other psych symptoms. Pt contracted for safety. Will continue to monitor and assist asneeded.        Pt remains on 15 minute  for safety and monitoring at this time.     .

## 2021-09-08 NOTE — PLAN OF CARE
Assessment/Intervention, Care Coordination and Current Symptoms:   CTC consulted with attending psychiatrist and met with patient. Patient was in the group area watching tv upon approach. She was cooperative and engaged during this interaction. Patient rated current depression 3/10, anxiety 0/10, and denied any current SI. She completed Rule 25 assessment yesterday with Queta from [facility]. Patient reported Queta provided her contact information and will follow up with patient regarding referrals for CD treatment. CTC assisting patient with scheduling outpatient mental health appointments including obtaining a psychiatry provider as patient had previously been receiving medication through PCP. Patient is in agreement with this plan. Anticipated discharge tomorrow 9/9/21 and patient reported her  will be able to pick her up. CTC will assist with any transportation coordination if needed. Patient denied any further questions or concerns for CTC at this time.    [facility] left a voicemail for patient's outpatient therapist Caridad Syed [phone] ext. [phone]. CTC requested a call back to schedule follow up therapy session.        Discharge Plan or Goal:  Home with community supports        Barriers to Discharge:  symptom stabilization, medication management, coordination of care        Referral Status:  medication management at [facility] for Rule 25 assessment       Legal Status:  72hr hold exp 00:01 on 9/10/2021     Sarai Buenrostro [facility], 9/8/2021, 2:15 PM

## 2021-09-08 NOTE — PROVIDER NOTIFICATION
09/08/21 1100   Engagement   Intervention Group   Topic Detail SW CBT   Attendance Attended   Patient Response Demonstrated understanding of materials provided   Concentrated on Task duration of group   Cognition Goal-directed   Mood/Affect Pleasant   Social/Behavioral Cooperative;Engaged   Thought Content Insightful     GROUP LENGTH (MINS):   45 minutes   RELEVANT PRIMARY DIAGNOSIS: Patient Active Problem List   Diagnosis     Labor and delivery, indication for care     Recurrent major depressive disorder with postpartum onset (H)     Anxiety     Attention deficit hyperactivity disorder (ADHD), combined type, moderate     History of substance abuse (H)     Migraine with aura, not intractable, without status migrainosus     Tobacco abuse     PTSD (post-traumatic stress disorder)     Elevated blood pressure reading without diagnosis of hypertension     Alcohol abuse     Cannabis dependence (H)     Amphetamine abuse (H)     Major depressive disorder, recurrent episode, severe with mixed features (H)     Cannabis abuse     ADHD (attention deficit hyperactivity disorder), combined type        TREATMENT MODALITY:      [x]CBT       []DBT       []ACT       []Interpersonal psychotherapy                                 []Psychoeducational therapy       []Skill development       []Other:        ATTENDANCE:        [x]Stayed for entire group     []Arrived late    [] Left early       # OF PATIENTS IN GROUP: 3   BILLABLE:     [x]Yes            []No   Notes:
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
## 2021-09-08 NOTE — PROGRESS NOTES
09/08/21 0910   Engagement   Intervention Group   Topic Detail OT: Education on 8 Dimensions of Wellness and interactive social activity (Garbage) to increase social wellness, intellectual wellness, concentration, focus, attention to task/detail, sequencing, taking turns, healthy leisure engagement, and social engagement   Attendance Attended   Patient Response Demonstrated understanding of materials provided;Was respectful   Concentrated on Task duration of group   Cognition Goal-directed;Follows through with task   Mood/Affect Content   Social/Behavioral Cooperative;Engaged   Thought Content Reality oriented     Pt sat among peers to complete activity and attempted to engage in social interactions with peers, despite minimal response from peers. Pt able to independently engage in activity after receiving verbal directions and visual demonstration.Pt progressing towards goal.

## 2021-09-08 NOTE — PROGRESS NOTES
0515-- Pt woke up, c/o headache 8/10 and nausea without vomiting. Tylenol prn was given, zofran ordered from pharmacy. CIWA was 5 related to headache and nausea. Pt declined anti-anxiety meds or prns for withdrawal. Pt said the headache makes her nauseous. Pt return to bed to sleep and by the time zofran was received from pharmacy, pt was already sleeping calmly in her bed. Nursing will assess and administered zofran later if symptom unresolved when pt wakes up. Re-assess pain, pt sounded asleep in her bed with no apparent distress.

## 2021-09-08 NOTE — PROGRESS NOTES
09/08/21 1415   Engagement   Intervention Group   Topic Detail OT: Haley manual & Garbage skills task for creative expression, planning/sequencing/attention to detail, concentration, attention to task/detail, and managing stress/sxs   Attendance Attended   Patient Response Demonstrated understanding of materials provided;Was respectful   Concentrated on Task duration of group   Cognition Goal-directed;Follows through with task   Mood/Affect Content;Pleasant   Social/Behavioral Cooperative;Engaged   Thought Content Reality oriented     Pt sat among peers to complete activity and engaged in social interactions with peers. Pt able to independently select and gather needed supplies. Pt worked in a neat and tidy manner to complete project and cleaned up all of her supplies. Pt progressing towards goal.

## 2021-09-08 NOTE — PLAN OF CARE
Problem: Behavioral Health Plan of Care  Goal: Adheres to Safety Considerations for Self and Others  Outcome: Improving     Problem: Behavioral Health Plan of Care  Goal: Absence of New-Onset Illness or Injury  Outcome: Improving     Problem: Suicidal Behavior  Goal: Suicidal Behavior is Absent or Managed  Outcome: Improving     Pt observed sleeping through the night without distress.  No c/o pain noted. Even and unlabored respiration. No apparent SI or self injurious behavior noted and no fall or injury noted.  Pt slept for over 6 hrs.  Continues to be on SI and SIB precautions.  Will continue to monitor.

## 2021-09-09 ENCOUNTER — TELEPHONE (OUTPATIENT)
Dept: BEHAVIORAL HEALTH | Facility: HOSPITAL | Age: 36
End: 2021-09-09

## 2021-09-09 VITALS
SYSTOLIC BLOOD PRESSURE: 132 MMHG | BODY MASS INDEX: 23.1 KG/M2 | HEIGHT: 64 IN | TEMPERATURE: 97.5 F | DIASTOLIC BLOOD PRESSURE: 86 MMHG | OXYGEN SATURATION: 96 % | WEIGHT: 135.3 LBS | RESPIRATION RATE: 18 BRPM | HEART RATE: 109 BPM

## 2021-09-09 DIAGNOSIS — F33.2 MAJOR DEPRESSIVE DISORDER, RECURRENT EPISODE, SEVERE WITH MIXED FEATURES (H): ICD-10-CM

## 2021-09-09 DIAGNOSIS — G47.9 SLEEP DIFFICULTIES: ICD-10-CM

## 2021-09-09 DIAGNOSIS — K59.09 OTHER CONSTIPATION: ICD-10-CM

## 2021-09-09 DIAGNOSIS — F90.2 ADHD (ATTENTION DEFICIT HYPERACTIVITY DISORDER), COMBINED TYPE: ICD-10-CM

## 2021-09-09 DIAGNOSIS — D50.8 OTHER IRON DEFICIENCY ANEMIA: ICD-10-CM

## 2021-09-09 DIAGNOSIS — F10.10 ALCOHOL ABUSE: ICD-10-CM

## 2021-09-09 DIAGNOSIS — F41.1 GAD (GENERALIZED ANXIETY DISORDER): ICD-10-CM

## 2021-09-09 DIAGNOSIS — F43.10 PTSD (POST-TRAUMATIC STRESS DISORDER): ICD-10-CM

## 2021-09-09 DIAGNOSIS — E56.9 VITAMIN DEFICIENCY: ICD-10-CM

## 2021-09-09 PROCEDURE — 250N000013 HC RX MED GY IP 250 OP 250 PS 637: Performed by: PSYCHIATRY & NEUROLOGY

## 2021-09-09 PROCEDURE — 99239 HOSP IP/OBS DSCHRG MGMT >30: CPT | Performed by: PSYCHIATRY & NEUROLOGY

## 2021-09-09 RX ORDER — FERROUS SULFATE 325(65) MG
325 TABLET ORAL 2 TIMES DAILY WITH MEALS
Qty: 60 TABLET | Refills: 0 | Status: SHIPPED | OUTPATIENT
Start: 2021-09-09 | End: 2021-09-09

## 2021-09-09 RX ORDER — PRENATAL VIT/IRON FUM/FOLIC AC 27MG-0.8MG
1 TABLET ORAL DAILY
Qty: 90 TABLET | Refills: 3 | Status: SHIPPED | OUTPATIENT
Start: 2021-09-09 | End: 2021-09-09

## 2021-09-09 RX ORDER — ATOMOXETINE 40 MG/1
40 CAPSULE ORAL DAILY
Qty: 30 CAPSULE | Refills: 0 | Status: SHIPPED | OUTPATIENT
Start: 2021-09-10

## 2021-09-09 RX ORDER — FOLIC ACID 1 MG/1
1 TABLET ORAL DAILY
Qty: 30 TABLET | Refills: 0 | Status: SHIPPED | OUTPATIENT
Start: 2021-09-10 | End: 2021-09-09

## 2021-09-09 RX ORDER — LANOLIN ALCOHOL/MO/W.PET/CERES
100 CREAM (GRAM) TOPICAL DAILY
Qty: 30 TABLET | Refills: 0 | Status: SHIPPED | OUTPATIENT
Start: 2021-09-10

## 2021-09-09 RX ORDER — PRAZOSIN HYDROCHLORIDE 2 MG/1
2 CAPSULE ORAL AT BEDTIME
Qty: 30 CAPSULE | Refills: 0 | Status: SHIPPED | OUTPATIENT
Start: 2021-09-09 | End: 2021-09-09

## 2021-09-09 RX ORDER — PRAZOSIN HYDROCHLORIDE 2 MG/1
2 CAPSULE ORAL AT BEDTIME
Qty: 30 CAPSULE | Refills: 0 | Status: SHIPPED | OUTPATIENT
Start: 2021-09-09

## 2021-09-09 RX ORDER — TRAZODONE HYDROCHLORIDE 50 MG/1
50 TABLET, FILM COATED ORAL
Qty: 30 TABLET | Refills: 0 | Status: SHIPPED | OUTPATIENT
Start: 2021-09-09 | End: 2021-09-09

## 2021-09-09 RX ORDER — ARIPIPRAZOLE 10 MG/1
10 TABLET ORAL DAILY
Qty: 30 TABLET | Refills: 0 | Status: SHIPPED | OUTPATIENT
Start: 2021-09-10

## 2021-09-09 RX ORDER — PRENATAL VIT/IRON FUM/FOLIC AC 27MG-0.8MG
1 TABLET ORAL DAILY
Qty: 90 TABLET | Refills: 3 | Status: SHIPPED | OUTPATIENT
Start: 2021-09-09

## 2021-09-09 RX ORDER — GABAPENTIN 300 MG/1
300 CAPSULE ORAL 3 TIMES DAILY
Qty: 90 CAPSULE | Refills: 0 | Status: SHIPPED | OUTPATIENT
Start: 2021-09-09 | End: 2021-09-09

## 2021-09-09 RX ORDER — LANOLIN ALCOHOL/MO/W.PET/CERES
100 CREAM (GRAM) TOPICAL DAILY
Qty: 30 TABLET | Refills: 0 | Status: SHIPPED | OUTPATIENT
Start: 2021-09-10 | End: 2021-09-09

## 2021-09-09 RX ORDER — HYDROXYZINE HYDROCHLORIDE 50 MG/1
50 TABLET, FILM COATED ORAL EVERY 6 HOURS PRN
Qty: 30 TABLET | Refills: 0 | Status: SHIPPED | OUTPATIENT
Start: 2021-09-09 | End: 2021-09-09

## 2021-09-09 RX ORDER — TRAZODONE HYDROCHLORIDE 50 MG/1
50 TABLET, FILM COATED ORAL
Qty: 30 TABLET | Refills: 0 | Status: SHIPPED | OUTPATIENT
Start: 2021-09-09

## 2021-09-09 RX ORDER — FERROUS SULFATE 325(65) MG
325 TABLET ORAL 2 TIMES DAILY WITH MEALS
Qty: 60 TABLET | Refills: 0 | Status: SHIPPED | OUTPATIENT
Start: 2021-09-09

## 2021-09-09 RX ORDER — ATOMOXETINE 40 MG/1
40 CAPSULE ORAL DAILY
Qty: 30 CAPSULE | Refills: 0 | Status: SHIPPED | OUTPATIENT
Start: 2021-09-10 | End: 2021-09-09

## 2021-09-09 RX ORDER — FOLIC ACID 1 MG/1
1 TABLET ORAL DAILY
Qty: 30 TABLET | Refills: 0 | Status: SHIPPED | OUTPATIENT
Start: 2021-09-10

## 2021-09-09 RX ORDER — AMOXICILLIN 250 MG
1 CAPSULE ORAL AT BEDTIME
Qty: 30 TABLET | Refills: 0 | Status: SHIPPED | OUTPATIENT
Start: 2021-09-09

## 2021-09-09 RX ORDER — HYDROXYZINE HYDROCHLORIDE 50 MG/1
50 TABLET, FILM COATED ORAL EVERY 6 HOURS PRN
Qty: 30 TABLET | Refills: 0 | Status: SHIPPED | OUTPATIENT
Start: 2021-09-09

## 2021-09-09 RX ORDER — GABAPENTIN 300 MG/1
300 CAPSULE ORAL 3 TIMES DAILY
Qty: 90 CAPSULE | Refills: 0 | Status: SHIPPED | OUTPATIENT
Start: 2021-09-09

## 2021-09-09 RX ORDER — AMOXICILLIN 250 MG
1 CAPSULE ORAL AT BEDTIME
Qty: 30 TABLET | Refills: 0 | Status: SHIPPED | OUTPATIENT
Start: 2021-09-09 | End: 2021-09-09

## 2021-09-09 RX ORDER — ARIPIPRAZOLE 10 MG/1
10 TABLET ORAL DAILY
Qty: 30 TABLET | Refills: 0 | Status: SHIPPED | OUTPATIENT
Start: 2021-09-10 | End: 2021-09-09

## 2021-09-09 RX ADMIN — GABAPENTIN 300 MG: 300 CAPSULE ORAL at 08:19

## 2021-09-09 RX ADMIN — Medication 100 MG: at 08:19

## 2021-09-09 RX ADMIN — PRENATAL VIT W/ FE FUMARATE-FA TAB 27-0.8 MG 1 TABLET: 27-0.8 TAB at 08:19

## 2021-09-09 RX ADMIN — NICOTINE POLACRILEX 4 MG: 4 GUM, CHEWING ORAL at 08:23

## 2021-09-09 RX ADMIN — FERROUS SULFATE TAB 325 MG (65 MG ELEMENTAL FE) 325 MG: 325 (65 FE) TAB at 08:19

## 2021-09-09 RX ADMIN — ANTACID TABLETS 500 MG: 500 TABLET, CHEWABLE ORAL at 08:19

## 2021-09-09 RX ADMIN — ATOMOXETINE 40 MG: 40 CAPSULE ORAL at 08:19

## 2021-09-09 RX ADMIN — NICOTINE POLACRILEX 4 MG: 4 GUM, CHEWING ORAL at 12:34

## 2021-09-09 RX ADMIN — GABAPENTIN 300 MG: 300 CAPSULE ORAL at 13:06

## 2021-09-09 RX ADMIN — FOLIC ACID 1 MG: 1 TABLET ORAL at 08:19

## 2021-09-09 RX ADMIN — ARIPIPRAZOLE 5 MG: 5 TABLET ORAL at 08:19

## 2021-09-09 ASSESSMENT — ACTIVITIES OF DAILY LIVING (ADL)
ORAL_HYGIENE: INDEPENDENT
DRESS: INDEPENDENT

## 2021-09-09 NOTE — PROGRESS NOTES
09/09/21 1514   Engagement   Intervention Group   Topic Detail OT: Positive Affirmations and Decopauge Boxes for self-worth/hopefulness, symtpom management and insight development   Attendance Did not attend   Patient Response Positive attitude;Was respectful;Prosocial behavior   Concentrated on Task duration of group   Cognition Goal-directed;Follows through with task   Mood/Affect Pleasant;Content   Social/Behavioral Cooperative;Engaged
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
## 2021-09-09 NOTE — PLAN OF CARE
Discharge plan or goal: Home with community supports     Today's Plan: [facility] consulted with psychiatrist and met with patient. She reported feeling ready to discharge home and is excited about her upcoming birthday plans. Patient rated her current depression 3/10, anxiety 5/10, and denied any current SI or HI. Patient will receive 30 day supply of medications upon discharge. [facility] established aftercare appointments for medication management and psychotherapy. Patient completed a Rule 25 assessment and [facility] will follow up with patient regarding referrals for SUDs treatment. Patient coordinated transportation home with her  and denied needing any assistance with this from [facility].    Pt has the following outpatient appointment(s) scheduled:   Appointment Type: Psychotherapy  Provider: ALFREDITO Chavez  Date & Time: Thursday, September 16th at 1:00PM  Clinic: [facility]  Address:  [address] [place] [address], [address] [address]  Phone: [phone]  Additional Notes: This is a telehealth appointment. You are able to schedule in-person appointments at [facility]. If you need to cancel, reschedule, or have any questions about this appointment please call [phone]. Please utilize the text scheduling line by texting [phone].    Appointment Type: Psychiatry  Provider: Dilia Oscar MD  Date & Time: Wednesday, September 29th at 11:00AM  Clinic: [facility]  Address: [address] [place] [address], [address] [address]  Phone: [phone]  Additional Notes: This appointment is in-person. Please arrive early to complete any intake paperwork. If you need to cancel, reschedule, or have any questions regarding this appointment please call [phone].    You completed a Rule 25 Assessment with Queta from [email or web address]. She will be in contact with you directly regarding referrals for substance abuse treatment. If you have any  questions please reach out to [facility] at [phone].    Pt has the following professional community support(s): medication management, psychotherapy, SUDs treatment referrals    Legal Status: Voluntary    Diagnosis/Procedure:   Patient Active Problem List   Diagnosis     Labor and delivery, indication for care     Recurrent major depressive disorder with postpartum onset (H)     Anxiety     Attention deficit hyperactivity disorder (ADHD), combined type, moderate     History of substance abuse (H)     Migraine with aura, not intractable, without status migrainosus     Tobacco abuse     PTSD (post-traumatic stress disorder)     Elevated blood pressure reading without diagnosis of hypertension     Alcohol abuse     Cannabis dependence (H)     Amphetamine abuse (H)     Major depressive disorder, recurrent episode, severe with mixed features (H)     Cannabis abuse     ADHD (attention deficit hyperactivity disorder), combined type     Generalized anxiety disorder       Care Rounds Attendance:   WANDA   RN   Charge RN   OT/TR  MD/CNP    Sarai Buenrostro, [facility], 9/9/2021, 2:28 PM

## 2021-09-09 NOTE — DISCHARGE INSTRUCTIONS
Behavioral Discharge Planning and Instructions    Summary: You were admitted on 9/4/2021  due to Depression and Suicidal Ideations.  You were treated by Dr Dana Shelby and discharged on 9/9/2021 from [facility] to Home    Main Diagnosis: Depression    Health Care Follow-up:   Appointment Type: Psychotherapy  Provider: ALFREDITO Chavez  Date & Time: Thursday, September 16th at 1:00PM  Clinic: [facility]  Address:  [address] [facility], [address], [place] [address]  Phone: [phone]  Additional Notes: This is a telehealth appointment. You are able to schedule in-person appointments at [facility]. If you need to cancel, reschedule, or have any questions about this appointment please call [phone]. Please utilize the text scheduling line by texting [phone].    Appointment Type: Psychiatry  Provider: Dilia Oscar MD  Date & Time: Wednesday, September 29th at 11:00AM  Clinic: [facility]  Address: [facility] [address], [address], [place] [address]  Phone: [phone]  Additional Notes: This appointment is in-person. Please arrive early to complete any intake paperwork. If you need to cancel, reschedule, or have any questions regarding this appointment please call [phone].    You completed a Rule 25 Assessment with Queta from [employer]. She will be in contact with you directly regarding referrals for substance abuse treatment. If you have any questions please reach out to [employer] at [phone].    Attend all scheduled appointments with your outpatient providers. Call at least 24 hours in advance if you need to reschedule an appointment to ensure continued access to your outpatient providers.     Major Treatments, Procedures and Findings:  You were provided with: a psychiatric assessment, assessed for medical stability, group therapy, CD evaluation/assessment and milieu management    Symptoms to Report:  "mood getting worse    Early warning signs can include: increased depression or anxiety    Safety and Wellness:  Take all medicines as directed.  Make no changes unless your doctor suggests them.      Follow treatment recommendations.  Refrain from alcohol and non-prescribed drugs.  If there is a concern for safety, call 911.    Resources:   National [place] [employer] Mental Illness (www.[place].toribio.org): 227.371.9040 or 314-294-4717.  National Suicide Prevention Line (www.mentalhealthmn.org): 274-864-DZYJ (6867)  [facility] Crisis (COPE) Response - Adult 178 758-6579  Crisis text line: Text \"[place]\" to 875870. Free, confidential, 24/7.  [facility] Crisis Team - Child: 738.533.7404    General Medication Instructions:   See your medication sheet(s) for instructions.   Take all medicines as directed.  Make no changes unless your doctor suggests them.   Go to all your doctor visits.  Be sure to have all your required lab tests. This way, your medicines can be refilled on time.  Do not use any drugs not prescribed by your doctor.  Avoid alcohol.    Advance Directives:   Scanned document on file with [facility]? No scanned doc  Is document scanned? Pt states no documents  Honoring Choices Your Rights Handout: Informed and given  Was more information offered? Pt declined    The Treatment team has appreciated the opportunity to work with you. If you have any questions or concerns about your recent admission, you can contact the unit which can receive your call 24 hours a day, 7 days a week. They will be able to get in touch with a Provider if needed. The unit number is [phone].  "

## 2021-09-09 NOTE — PLAN OF CARE
Problem: Behavioral Health Plan of Care  Goal: Adheres to Safety Considerations for Self and Others  Outcome: Improving     Problem: Behavioral Health Plan of Care  Goal: Absence of New-Onset Illness or Injury  Outcome: Improving     Problem: Suicidal Behavior  Goal: Suicidal Behavior is Absent or Managed  Outcome: Improving     No apparent SI, SIB, or other psych symptoms. Pt slept through the night uneventfully. Continues to be on SI and SIB precautions.  Nursing will follow plan of care.

## 2021-09-09 NOTE — PROVIDER NOTIFICATION
09/09/21 1400   Engagement   Intervention Group   Topic Detail SW DBT   Attendance Attended   Patient Response Demonstrated understanding of materials provided   Concentrated on Task duration of group   Cognition Goal-directed   Mood/Affect Pleasant   Social/Behavioral Cooperative;Engaged   Thought Content Reality oriented     GROUP LENGTH (MINS):   45   RELEVANT PRIMARY DIAGNOSIS: Patient Active Problem List   Diagnosis     Labor and delivery, indication for care     Recurrent major depressive disorder with postpartum onset (H)     Anxiety     Attention deficit hyperactivity disorder (ADHD), combined type, moderate     History of substance abuse (H)     Migraine with aura, not intractable, without status migrainosus     Tobacco abuse     PTSD (post-traumatic stress disorder)     Elevated blood pressure reading without diagnosis of hypertension     Alcohol abuse     Cannabis dependence (H)     Amphetamine abuse (H)     Major depressive disorder, recurrent episode, severe with mixed features (H)     Cannabis abuse     ADHD (attention deficit hyperactivity disorder), combined type     Generalized anxiety disorder        TREATMENT MODALITY:      []CBT       [x]DBT       []ACT       []Interpersonal psychotherapy                                 []Psychoeducational therapy       [x]Skill development       []Other:        ATTENDANCE:        [x]Stayed for entire group     []Arrived late    [] Left early       # OF PATIENTS IN GROUP: 3   BILLABLE:     [x]Yes            []No   Notes:

## 2021-09-09 NOTE — PLAN OF CARE
BEHAVIORAL TEAM DISCUSSION    Participants: Tana WINSTON- Pharmacy, Kaye S - OT, Sarai GRANT - CTC, Maria L Brown RN, Dr Shelby - Psychiatry  Progress: Improving  Anticipated length of stay: 9/9/21  Continued Stay Criteria/Rationale: medication management, symptom stabilization, coordination of care  Medical/Physical: none reported  Precautions:   Behavioral Orders   Procedures    Code 1 - Restrict to Unit    Routine Programming     As clinically indicated    Rule 25    Rule 25    Self Injury Precaution    Status 15     Every 15 minutes.    Suicide precautions     Patients on Suicide Precautions should have a Combination Diet ordered that includes a Diet selection(s) AND a Behavioral Tray selection for Safe Tray - with utensils, or Safe Tray - NO utensils       Plan: Home with community supports  Rationale for change in precautions or plan: no change

## 2021-09-09 NOTE — PROGRESS NOTES
09/09/21 1014   Engagement   Intervention Group   Topic Detail OT: Nail care and socialization for coping with stress/sxs, building social connections and increasing sense of self-worth/hope   Attendance Attended   Patient Response Demonstrated understanding of materials provided;Was respectful;Positive attitude   Concentrated on Task duration of group   Cognition Goal-directed;Follows through with task   Mood/Affect Bright;Pleasant   Social/Behavioral Cooperative;Engaged

## 2021-09-09 NOTE — PLAN OF CARE
Problem: Behavioral Health Plan of Care  Goal: Plan of Care Review  Outcome: Improving  Flowsheets (Taken 9/9/2021 0820)  Plan of Care Reviewed With: patient  Patient Agreement with Plan of Care: agrees   Patient was pleasant and cooperative with assessment. She rated anxiety 4 and depression 0. She denied SI, Hi, pain and hallucination. Ciwa was 2 this am.  She attended groups. Patient discharged to home at 1455 accompanied by her . Instruction regarding medication, activities and appointment provided to patient. All belongings sent with patient.  Unable to send patient's medication due medication stuck in the tube system. [facility],  and orders will be send to [facility] pharmacy in  [facility] on [address] [address] [facility]. Also called [facility] pharmacy to contact the [facility] pharmacy the coordinate the orders.

## 2021-09-09 NOTE — TELEPHONE ENCOUNTER
Medication order for all discharge meds updated to Dr Shelby/Charge RN per verbal order. Updated RX sent to the pharmacy and medication list in [facility] updated.    Manoj Olguin, PharmD  [facility]   [facility] Pharmacist on behalf of [facility]

## 2021-09-09 NOTE — PLAN OF CARE
Occupational Therapy Discharge Note    Patient Name:  Emerita Lemon    D: Refer to daily doc flowsheets for details of progress toward goals.  A: Improvement seen in mood, symptom management and hope for the future.   P: Patient discharging Home with community supports . Discontinue OT Care Plan goals and interventions.    Problem: Coping with Symptoms  Goal: Practice Coping Skills  Description: Pt will engage in 3 or more coping skills to manage stress and symptoms   Outcome: Completed     Date: 9/9/2021  Time: 3:15 PM

## 2021-10-06 DIAGNOSIS — E56.9 VITAMIN DEFICIENCY: ICD-10-CM

## 2021-10-06 DIAGNOSIS — F90.2 ADHD (ATTENTION DEFICIT HYPERACTIVITY DISORDER), COMBINED TYPE: ICD-10-CM

## 2021-10-06 DIAGNOSIS — F33.2 MAJOR DEPRESSIVE DISORDER, RECURRENT EPISODE, SEVERE WITH MIXED FEATURES (H): ICD-10-CM

## 2021-10-06 DIAGNOSIS — F10.10 ALCOHOL ABUSE: ICD-10-CM

## 2021-10-06 DIAGNOSIS — F43.10 PTSD (POST-TRAUMATIC STRESS DISORDER): ICD-10-CM

## 2021-10-06 RX ORDER — GABAPENTIN 300 MG/1
CAPSULE ORAL
Qty: 90 CAPSULE | Refills: 0 | OUTPATIENT
Start: 2021-10-06

## 2021-10-06 RX ORDER — FOLIC ACID 1 MG/1
TABLET ORAL
Qty: 30 TABLET | Refills: 0 | OUTPATIENT
Start: 2021-10-06

## 2021-10-06 RX ORDER — PRAZOSIN HYDROCHLORIDE 2 MG/1
CAPSULE ORAL
Qty: 30 CAPSULE | Refills: 0 | OUTPATIENT
Start: 2021-10-06

## 2021-10-06 RX ORDER — ATOMOXETINE 40 MG/1
CAPSULE ORAL
Qty: 30 CAPSULE | Refills: 0 | OUTPATIENT
Start: 2021-10-06

## 2021-10-06 RX ORDER — ARIPIPRAZOLE 10 MG/1
TABLET ORAL
Qty: 30 TABLET | Refills: 0 | OUTPATIENT
Start: 2021-10-06

## 2022-12-19 NOTE — TELEPHONE ENCOUNTER
Received a faxed refill request for a prenatal vitamin from [facility]. This patient is no longer under the care of Dr. Shelby. Does have PCP Dr. Nya Martinez listed. RN updated pharmacy.     Dorinda Gonzales RN on 12/19/2022 at 9:36 AM

## 2023-05-24 DIAGNOSIS — E56.9 VITAMIN DEFICIENCY: ICD-10-CM

## 2023-05-25 RX ORDER — PRENATAL VIT/IRON FUM/FOLIC AC 27MG-0.8MG
1 TABLET ORAL DAILY
OUTPATIENT
Start: 2023-05-25

## 2023-05-25 NOTE — CONFIDENTIAL NOTE
Received a faxed refill request for a prenatal vitamin from [facility]. This patient is no longer under the care of Dr. Shelby. Does have PCP Dr. Nya Martinez listed.